# Patient Record
(demographics unavailable — no encounter records)

---

## 2025-05-08 NOTE — HISTORY OF PRESENT ILLNESS
[Sweating] : no sweating [Muscle Weakness] : no muscle weakness [Change in School Performance] : no change in school performance [Weakness] : no weakness [TWNoteComboBox1] : abnormal thyroid function [Regular Periods] : regular periods [Headaches] : no headaches [Visual Symptoms] : no ~T visual symptoms [Polyuria] : no polyuria [Polydipsia] : no polydipsia [Knee Pain] : no knee pain [Hip Pain] : no hip pain [Constipation] : no constipation [Cold Intolerance] : no cold intolerance [Palpitations] : no palpitations [Heat Intolerance] : no heat intolerance [Fatigue] : no fatigue [Anorexia] : no anorexia [Abdominal Pain] : no abdominal pain [Nausea] : no nausea [Vomiting] : no vomiting [FreeTextEntry2] : Mona is an 18 year old girl with acquired hypothyroidism due to Hashimoto's thyroiditis.  She was seen by me initially in 8/17 prior to which time her pediatrician noted a goiter on examination.  Laboratory testing from 8/5/17 showed TSH elevated at 8.15 uIU/ml, FT4 normal at 1.0 ng/dl; TPO Ab positive at 1725 IU/ml, Tg Ab negative.  She has a strong family history of thyroid disorders.  On examination she was noted to have a diffusely enlarged thyroid gland.  Her repeat TFTs confirmed hypothyroidism and were similar to the previous levels.  She was started on levothyroxine 50 mcg daily, then increased to 62.5 mcg daily. Her BMI has been noted to be in the underweight to low normal range. She was last seen by me in May 2020.  She was last seen here by me in 4/2024 at which time TSH was mildly above range and levothyroxine was continued at 62.5 mcg daily.   Mona returns for follow up of her Hashimoto's thyroiditis. She reports that  .  She is taking her levothyroxine daily with   missed doses; she takes it before bedtime hours after eating.     18 year old girl with mild acquired hypothyroidism due to Hashimoto's thyroiditis. On her current dose of levothyroxine she is overall clinically . BMI is . Thyroid tests will be done to determine if her levothyroxine dose needs to be adjusted. She will follow up with me in 6 months.

## 2025-05-08 NOTE — PHYSICAL EXAM
[Healthy Appearing] : healthy appearing [Interactive] : interactive [Dysmorphic] : non-dysmorphic [Normal Appearance] : normal appearance [Well formed] : well formed [Normally Set] : normally set [Enlarged Diffusely] : was diffusely enlarged [None] : there were no thyroid nodules [Normal] : normal  [de-identified] : thin [de-identified] : wearing glasses [de-identified] : no lymph nodes or nodules palpated by patient/mother

## 2025-05-08 NOTE — CONSULT LETTER
[Dear  ___] : Dear  [unfilled], [Courtesy Letter:] : I had the pleasure of seeing your patient, [unfilled], in my office today. [Please see my note below.] : Please see my note below. [Sincerely,] : Sincerely, [FreeTextEntry3] : Shahla Cassidy MD

## 2025-06-13 NOTE — HISTORY OF PRESENT ILLNESS
[Regular Periods] : regular periods [FreeTextEntry2] : Mona is a 19y0mo old Female with acquired hypothyroidism due to Hashimoto's thyroiditis.   She has been followed by Dr Cassidy. She was seen initially in 8/17 prior to which time her pediatrician noted a goiter on examination. Laboratory testing from 8/5/17 showed TSH elevated at 8.15 uIU/ml, FT4 normal at 1.0 ng/dl; TPO Ab positive at 1725 IU/ml, Tg Ab negative. She has a strong family history of thyroid disorders. On examination she was noted to have a diffusely enlarged thyroid gland. Her repeat TFTs confirmed hypothyroidism and were similar to the previous levels. She was started on levothyroxine 50 mcg daily, then increased to 62.5 mcg daily.  She was last seen in April 2024 at which time TSH was mildly elevated to 4.7 and we advised making sure she is taking her levothyroxine every day, as she had reported occasional missed doses.   Since last visit, Mona reports that she has been doing well. She is taking her levothyroxine daily with occasional missed doses; she takes it before bedtime hours after eating.  She reports no symptoms. She denies fatigue, constipation, cold intolerance, dry skin. She also takes Lexapro. 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

## 2025-06-13 NOTE — HISTORY OF PRESENT ILLNESS
[Regular Periods] : regular periods [FreeTextEntry2] : Mona is a 19y0mo old Female with acquired hypothyroidism due to Hashimoto's thyroiditis.   She has been followed by Dr Cassidy. She was seen initially in 8/17 prior to which time her pediatrician noted a goiter on examination. Laboratory testing from 8/5/17 showed TSH elevated at 8.15 uIU/ml, FT4 normal at 1.0 ng/dl; TPO Ab positive at 1725 IU/ml, Tg Ab negative. She has a strong family history of thyroid disorders. On examination she was noted to have a diffusely enlarged thyroid gland. Her repeat TFTs confirmed hypothyroidism and were similar to the previous levels. She was started on levothyroxine 50 mcg daily, then increased to 62.5 mcg daily.  She was last seen in April 2024 at which time TSH was mildly elevated to 4.7 and we advised making sure she is taking her levothyroxine every day, as she had reported occasional missed doses.   Since last visit, Mona reports that she has been doing well. She is taking her levothyroxine daily with occasional missed doses; she takes it before bedtime hours after eating.  She reports no symptoms. She denies fatigue, constipation, cold intolerance, dry skin. She also takes Lexapro. 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

## 2025-06-13 NOTE — ASSESSMENT
[FreeTextEntry1] : Mona is a 18yo F with hypothyroidism due to Hashimoto thyroiditis  She is currently managed with Levothyroxine 62.5mcg daily (1/2 of the 125mcg tablet). She reports occasional missed doses. She denies any obvious symptoms of hypothyroidism today. We will repeat TFTs today. She should transition to adult care; a list of adult endocrinologists was provided to patient during the appointment today.   Patient's cell number is [phone]. She says it is ok to leave VM message with normal results.  The condition, natural history, and prognosis were explained to the patient. The clinical findings and results were reviewed. All questions answered. The patient expressed understanding and agreement with the above.  Ana Celestin PA-C Pediatric Endocrinology

## 2025-06-13 NOTE — ASSESSMENT
[FreeTextEntry1] : Mona is a 20yo F with hypothyroidism due to Hashimoto thyroiditis  She is currently managed with Levothyroxine 62.5mcg daily (1/2 of the 125mcg tablet). She reports occasional missed doses. She denies any obvious symptoms of hypothyroidism today. We will repeat TFTs today. She should transition to adult care; a list of adult endocrinologists was provided to patient during the appointment today.   Patient's cell number is [phone]. She says it is ok to leave VM message with normal results.  The condition, natural history, and prognosis were explained to the patient. The clinical findings and results were reviewed. All questions answered. The patient expressed understanding and agreement with the above.  Ana Celestin PA-C Pediatric Endocrinology

## 2025-06-13 NOTE — ADDENDUM
[FreeTextEntry1] : TFTs are normal. No change in dose needed, continue Levothyroxine 62.5mcg daily, Rx sent to pharmacy. Left VM on patient's cell number per patients preference.  Ana Celestin PA-C Pediatric Endocrinology

## 2025-06-13 NOTE — PHYSICAL EXAM
[Healthy Appearing] : healthy appearing [Well Nourished] : well nourished [Interactive] : interactive [Normal Appearance] : normal appearance [Well formed] : well formed [Normally Set] : normally set [Normal S1 and S2] : normal S1 and S2 [Clear to Ausculation Bilaterally] : clear to auscultation bilaterally [Abdomen Tenderness] : non-tender [Abdomen Soft] : soft [] : no hepatosplenomegaly [Normal] : normal  [None] : there were no thyroid nodules [Goiter] : no goiter [Murmur] : no murmurs